# Patient Record
Sex: MALE | Race: OTHER
[De-identification: names, ages, dates, MRNs, and addresses within clinical notes are randomized per-mention and may not be internally consistent; named-entity substitution may affect disease eponyms.]

---

## 2018-07-06 ENCOUNTER — HOSPITAL ENCOUNTER (EMERGENCY)
Dept: HOSPITAL 62 - ER | Age: 28
Discharge: HOME | End: 2018-07-06
Payer: OTHER GOVERNMENT

## 2018-07-06 VITALS — DIASTOLIC BLOOD PRESSURE: 71 MMHG | SYSTOLIC BLOOD PRESSURE: 122 MMHG

## 2018-07-06 DIAGNOSIS — M54.5: Primary | ICD-10-CM

## 2018-07-06 LAB
ANION GAP SERPL CALC-SCNC: 12 MMOL/L (ref 5–19)
APPEARANCE UR: CLEAR
APTT PPP: YELLOW S
BILIRUB UR QL STRIP: NEGATIVE
BUN SERPL-MCNC: 15 MG/DL (ref 7–20)
CALCIUM: 9.9 MG/DL (ref 8.4–10.2)
CHLORIDE SERPL-SCNC: 102 MMOL/L (ref 98–107)
CO2 SERPL-SCNC: 29 MMOL/L (ref 22–30)
GLUCOSE SERPL-MCNC: 101 MG/DL (ref 75–110)
GLUCOSE UR STRIP-MCNC: NEGATIVE MG/DL
KETONES UR STRIP-MCNC: NEGATIVE MG/DL
NITRITE UR QL STRIP: NEGATIVE
PH UR STRIP: 6 [PH] (ref 5–9)
POTASSIUM SERPL-SCNC: 4.4 MMOL/L (ref 3.6–5)
PROT UR STRIP-MCNC: NEGATIVE MG/DL
SODIUM SERPL-SCNC: 142.7 MMOL/L (ref 137–145)
SP GR UR STRIP: 1.01
UROBILINOGEN UR-MCNC: NEGATIVE MG/DL (ref ?–2)

## 2018-07-06 PROCEDURE — 81001 URINALYSIS AUTO W/SCOPE: CPT

## 2018-07-06 PROCEDURE — 36415 COLL VENOUS BLD VENIPUNCTURE: CPT

## 2018-07-06 PROCEDURE — 96372 THER/PROPH/DIAG INJ SC/IM: CPT

## 2018-07-06 PROCEDURE — 99283 EMERGENCY DEPT VISIT LOW MDM: CPT

## 2018-07-06 PROCEDURE — 80048 BASIC METABOLIC PNL TOTAL CA: CPT

## 2018-07-06 NOTE — ER DOCUMENT REPORT
ED General





- General


Chief Complaint: Back Pain


Stated Complaint: BACK PAIN, NO INJURY


Time Seen by Provider: 07/06/18 06:06


Mode of Arrival: Ambulatory


Information source: Patient


Notes: 





27-year-old male with no reported past medical history presents with complaint 

of back pain that started 1 day prior to arrival.  Pain is located in his low 

back bilaterally.  He describes it as a throbbing, constant pain that is 

relieved with warm compresses.  Patient denies any aggravating factors.  He 

denies any recent injury, prior similar symptoms.  Patient denies fever, saddle 

anesthesia, urinary retention and fecal incontinence, IV drug use.  He does 

state that he was recently ill last week with diarrhea which has now resolved.


TRAVEL OUTSIDE OF THE [place] IN LAST 30 DAYS: No





- HPI


Onset: Yesterday


Onset/Duration: Gradual, Persistent, Worse


Quality of pain: Achy, Throbbing


Severity: Mild


Associated symptoms: None


Exacerbated by: Denies


Relieved by: Other - warm compresses


Similar symptoms previously: No


Recently seen / treated by doctor: No





- Related Data


Allergies/Adverse Reactions: 


 





No Known Allergies Allergy (Unverified 07/06/18 05:45)


 











Past Medical History





- General


Information source: Patient, [facility] Records





- Social History


Smoking Status: Never Smoker


Frequency of alcohol use: None


Drug Abuse: None


Lives with: Parents


Family History: Reviewed & Not Pertinent


Patient has suicidal ideation: No


Patient has homicidal ideation: No





- Medical History


Medical History: Negative


Renal/ Medical History: Denies: Hx Peritoneal Dialysis





Review of Systems





- Review of Systems


Constitutional: denies: Chills, Fever, Weakness


EENT: denies: Blurred vision, Difficulty swallowing


Cardiovascular: denies: Chest pain, Palpitations, Heart racing, Lightheaded


Respiratory: denies: Short of breath


Gastrointestinal: denies: Abdominal pain


Genitourinary: denies: Dysuria, Flank pain, Hematuria


Male Genitourinary: No symptoms reported


Musculoskeletal: Back pain


Skin: denies: Rash


Neurological/Psychological: denies: Weakness, Gait changes, Headaches


-: Yes All other systems reviewed and negative





Physical Exam





- Vital signs


Vitals: 


 











Temp Pulse Resp BP Pulse Ox


 


 98.7 F   92   18   125/79   99 


 


 07/06/18 05:44  07/06/18 05:44  07/06/18 05:44  07/06/18 05:44  07/06/18 05:44














- Notes


Notes: 





PHYSICAL EXAMINATION:





GENERAL: Well-appearing, well-nourished and in no acute distress.





HEAD: Atraumatic, normocephalic.





EYES: Pupils equal round and reactive to light, extraocular movements intact, 

sclera anicteric, conjunctiva are normal.





ENT: Nares patent, oropharynx clear without exudates.  Moist mucous membranes.





NECK: Normal range of motion, supple without lymphadenopathy





LUNGS: Breath sounds clear to auscultation bilaterally and equal.  No wheezes 

rales or rhonchi.





HEART: Regular rate and rhythm without murmurs





ABDOMEN: Soft, nontender, nondistended abdomen.  No guarding, no rebound.  No 

masses appreciated.  Thoracic and lumbar spine nontender.





Musculoskeletal: Normal range of motion, no pitting or edema.  No cyanosis.





NEUROLOGICAL: Cranial nerves grossly intact.  Normal speech, normal gait.  

Normal sensory, motor exams 





PSYCH: Normal mood, normal affect.





SKIN: Warm, Dry, normal turgor, no rashes or lesions noted.





Course





- Re-evaluation


Re-evalutation: 








Laboratory











  07/06/18 07/06/18





  06:30 06:30


 


Sodium  142.7 


 


Potassium  4.4 


 


Chloride  102 


 


Carbon Dioxide  29 


 


Anion Gap  12 


 


BUN  15 


 


Creatinine  0.89 


 


Est GFR ( Amer)  > 60 


 


Est GFR (Non-Af Amer)  > 60 


 


Glucose  101 


 


Calcium  9.9 


 


Urine Color   YELLOW


 


Urine Appearance   CLEAR


 


Urine pH   6.0


 


Ur Specific Gravity   1.015


 


Urine Protein   NEGATIVE


 


Urine Glucose (UA)   NEGATIVE


 


Urine Ketones   NEGATIVE


 


Urine Blood   SMALL H


 


Urine Nitrite   NEGATIVE


 


Urine Bilirubin   NEGATIVE


 


Urine Urobilinogen   NEGATIVE


 


Ur Leukocyte Esterase   NEGATIVE


 


Urine WBC (Auto)   0


 


Urine RBC (Auto)   0


 


Urine Mucus (Auto)   RARE


 


Urine Ascorbic Acid   NEGATIVE











07/06/18 06:22


27-year-old male with no reported past medical history presents with complaint 

of back pain that started 1 day prior to arrival.  Pain is located in his low 

back bilaterally.  He describes it as a throbbing, constant pain that is 

relieved with warm compresses.  Patient denies any aggravating factors.  He 

denies any recent injury, prior similar symptoms.  Patient denies fever, saddle 

anesthesia, urinary retention and fecal incontinence, IV drug use.  Patient was 

seen by myself upon arrival.  Vital signs were reviewed. Patient is afebrile, 

normotensive and not hypoxic.  Patient does not appear toxic or dehydrated.  

They are in no acute distress.  Previous medical records and nursing notes 

reviewed.  Patient received Toradol, Flexeril.


07/06/18 07:12


BMP shows no electrolyte abnormalities and normal kidney function.  Urinalysis 

is essentially normal with a small amount of blood.  Patient's pain is not 

consistent with urolithiasis.  Exam consistent with musculoskeletal back pain.  

Patient has no red flag symptoms which would raise concern for cauda equina 

spinal abscess these include fever, midline tenderness, lower extremity weakness

, saddle anesthesia, urinary retention, history of IV drug use.  Patient will 

be discharged home with a prescription for Flexeril, Motrin.  He was advised to 

keep active and use heat if that helps him.  Patient provided the opportunity 

to ask questions, and express concerns.  Discharge instructions discussed. 

Patient is agreeable with discharge home.  Return indications explained and 

discussed with the patient who displays understanding.  Patient encouraged to 

return to the emergency department immediately with any concerns.


07/06/18 07:13








- Vital Signs


Vital signs: 


 











Temp Pulse Resp BP Pulse Ox


 


 98.7 F   92   18   125/79   99 


 


 07/06/18 05:44  07/06/18 05:44  07/06/18 05:44  07/06/18 05:44  07/06/18 05:44














- Laboratory


Result Diagrams: 


 07/06/18 06:30


Laboratory results interpreted by me: 


 











  07/06/18





  06:30


 


Urine Blood  SMALL H














Discharge





- Discharge


Clinical Impression: 


Low back pain


Qualifiers:


 Chronicity: acute Back pain laterality: bilateral Sciatica presence: without 

sciatica Qualified Code(s): M54.5 - Low back pain





Condition: Good


Disposition: HOME, SELF-CARE


Instructions:  Low Back Pain ([facility]), Pain Medication Injection ([facility]), Muscle 

Strain ([facility]), Warm Packs ([facility])


Additional Instructions: 


Follow up with your physician tomorrow for further care or return to the ED 

IMMEDIATELY if symptoms worsen or new concerns occur. If you cannot afford to 

follow up with your primary care physician a list of low cost clinics have been 

provided at the end of your discharge papers as well.


Prescriptions: 


Cyclobenzaprine HCl [Flexeril 10 mg Tablet] 10 mg PO TIDP PRN #15 tab


 PRN Reason: 


Ibuprofen [Motrin 800 mg Tablet] 800 mg PO Q8H PRN #30 tab


 PRN Reason: